# Patient Record
Sex: FEMALE
[De-identification: names, ages, dates, MRNs, and addresses within clinical notes are randomized per-mention and may not be internally consistent; named-entity substitution may affect disease eponyms.]

---
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
## 2019-06-04 ENCOUNTER — APPOINTMENT (OUTPATIENT)
Dept: PEDIATRICS | Facility: CLINIC | Age: 19
End: 2019-06-04
Payer: COMMERCIAL

## 2019-06-04 VITALS
DIASTOLIC BLOOD PRESSURE: 60 MMHG | TEMPERATURE: 98.3 F | HEART RATE: 74 BPM | SYSTOLIC BLOOD PRESSURE: 114 MMHG | WEIGHT: 96 LBS | RESPIRATION RATE: 18 BRPM | BODY MASS INDEX: 17.66 KG/M2 | HEIGHT: 62 IN

## 2019-06-04 DIAGNOSIS — Z00.00 ENCOUNTER FOR GENERAL ADULT MEDICAL EXAMINATION W/OUT ABNORMAL FINDINGS: ICD-10-CM

## 2019-06-04 DIAGNOSIS — Z00.129 ENCOUNTER FOR ROUTINE CHILD HEALTH EXAMINATION W/OUT ABNORMAL FINDINGS: ICD-10-CM

## 2019-06-04 DIAGNOSIS — F41.9 ANXIETY DISORDER, UNSPECIFIED: ICD-10-CM

## 2019-06-04 PROCEDURE — 90471 IMMUNIZATION ADMIN: CPT

## 2019-06-04 PROCEDURE — 90621 MENB-FHBP VACC 2/3 DOSE IM: CPT

## 2019-06-04 PROCEDURE — 99395 PREV VISIT EST AGE 18-39: CPT | Mod: 25

## 2019-06-04 RX ORDER — CLONAZEPAM 1 MG/1
1 TABLET ORAL
Refills: 0 | Status: COMPLETED | COMMUNITY
End: 2019-06-04

## 2019-06-04 RX ORDER — CLONAZEPAM 1 MG/1
1 TABLET, ORALLY DISINTEGRATING ORAL
Qty: 90 | Refills: 0 | Status: COMPLETED | COMMUNITY
Start: 2017-07-15 | End: 2019-06-04

## 2019-06-04 RX ORDER — CLONAZEPAM 0.5 MG/1
0.5 TABLET, ORALLY DISINTEGRATING ORAL
Qty: 150 | Refills: 0 | Status: ACTIVE | COMMUNITY
Start: 2017-12-08

## 2019-06-04 RX ORDER — SERTRALINE HYDROCHLORIDE 20 MG/ML
20 SOLUTION ORAL
Qty: 60 | Refills: 0 | Status: COMPLETED | COMMUNITY
Start: 2017-12-08 | End: 2019-06-04

## 2019-06-04 RX ORDER — ESCITALOPRAM OXALATE 5 MG/5ML
5 SOLUTION ORAL
Qty: 600 | Refills: 0 | Status: COMPLETED | COMMUNITY
Start: 2017-11-17 | End: 2019-06-04

## 2019-06-04 NOTE — HISTORY OF PRESENT ILLNESS
[Yes] : Patient goes to dentist yearly [Mother] : mother [Up to date] : Up to date [Normal] : normal [LMP: _____] : LMP: [unfilled] [Cycle Length: _____ days] : Cycle Length: [unfilled] days [Days of Bleeding: _____] : Days of bleeding: [unfilled] [Eats meals with family] : eats meals with family [Has family members/adults to turn to for help] : has family members/adults to turn to for help [Sleep Concerns] : sleep concerns [Is permitted and is able to make independent decisions] : Is permitted and is able to make independent decisions [Grade: ____] : Grade: [unfilled] [Normal Behavior/Attention] : normal behavior/attention [Normal Performance] : normal performance [Drinks non-sweetened liquids] : drinks non-sweetened liquids  [Eats regular meals including adequate fruits and vegetables] : eats regular meals including adequate fruits and vegetables [Normal Homework] : normal homework [Has concerns about body or appearance] : does not have concerns about body or appearance [Calcium source] : calcium source [At least 1 hour of physical activity a day] : at least 1 hour of physical activity a day [Has friends] : has friends [Has interests/participates in community activities/volunteers] : does not have interests/participates in community activities/volunteers [Screen time (except homework) less than 2 hours a day] : screen time (except homework) less than 2 hours a day [Uses electronic nicotine delivery system] : does not use electronic nicotine delivery system [Exposure to electronic nicotine delivery system] : no exposure to electronic nicotine delivery system [Uses tobacco] : does not use tobacco [Uses drugs] : does not use drugs  [Exposure to tobacco] : no exposure to tobacco [Exposure to drugs] : no exposure to drugs [Exposure to alcohol] : no exposure to alcohol [Drinks alcohol] : does not drink alcohol [Uses safety belts/safety equipment] : uses safety belts/safety equipment  [Has peer relationships free of violence] : has peer relationships free of violence [Impaired/distracted driving] : no impaired/distracted driving [Displays self-confidence] : displays self-confidence [No] : Patient has not had sexual intercourse. [Has ways to cope with stress] : has ways to cope with stress [Has problems with sleep] : does not have problems with sleep [Gets depressed, anxious, or irritable/has mood swings] : does not get depressed, anxious, or irritable/has mood swings [Has thought about hurting self or considered suicide] : has not thought about hurting self or considered suicide [With Teen] : teen [With Parent/Guardian] : parent/guardian [FreeTextEntry7] : 20 yo female doing well [FreeTextEntry8] : on OCP [de-identified] : doing well but still sees GI for abdominal discomfort [de-identified] : walking [de-identified] : going into 3rd year of college in sept [de-identified] : therapist once a week, occasional anxiety helped by meds [FreeTextEntry1] : 19 yr old doing well. In college. Followed by GI and Psych.

## 2019-06-04 NOTE — PHYSICAL EXAM
[Alert] : alert [EOMI Bilateral] : EOMI bilateral [No Acute Distress] : no acute distress [Normocephalic] : normocephalic [Pink Nasal Mucosa] : pink nasal mucosa [Clear tympanic membranes with bony landmarks and light reflex present bilaterally] : clear tympanic membranes with bony landmarks and light reflex present bilaterally  [Nonerythematous Oropharynx] : nonerythematous oropharynx [Supple, full passive range of motion] : supple, full passive range of motion [Clear to Ausculatation Bilaterally] : clear to auscultation bilaterally [Regular Rate and Rhythm] : regular rate and rhythm [No Palpable Masses] : no palpable masses [No Murmurs] : no murmurs [+2 Femoral Pulses] : +2 femoral pulses [Normal S1, S2 audible] : normal S1, S2 audible [NonTender] : non tender [Soft] : soft [Normoactive Bowel Sounds] : normoactive bowel sounds [Non Distended] : non distended [No Abnormal Lymph Nodes Palpated] : no abnormal lymph nodes palpated [No Splenomegaly] : no splenomegaly [No Hepatomegaly] : no hepatomegaly [No Gait Asymmetry] : no gait asymmetry [Normal Muscle Tone] : normal muscle tone [No pain or deformities with palpation of bone, muscles, joints] : no pain or deformities with palpation of bone, muscles, joints [Straight] : straight [Cranial Nerves Grossly Intact] : cranial nerves grossly intact [+2 Patella DTR] : +2 patella DTR [No Rash or Lesions] : no rash or lesions

## 2019-06-04 NOTE — DISCUSSION/SUMMARY
[] : Counseling for  all components of the vaccines given today (see orders below) discussed with patient and patient’s parent/legal guardian. VIS statement provided as well. All questions answered. [FreeTextEntry1] : 19 yr old female doing well. FOllowed my GI and Psych. No gI dx. Has an appendiceal cyst which is being observed. ?IBS?? Followed for anxiety. On lexapro and klonapin/. Good weight gain this visit. Diet discussed. Ding well at college.  19 year female here for well visit. Normal growth and development observed. Recommend balanced diet with all food groups. Brush teeth twice daily and recommend visiting dentist twice per year for cleaning. Maintain consistent daily routines and sleep schedule. Personal hygiene, puberty, and sexual health reviewed. Risky behaviors assessed. School progress discussed. Limit screen time to less than 2 hours per day. Encourage physical activity.  Return 1 year for routine well visit.\par trumemba vaccine discussed.Given

## 2019-06-15 LAB
25(OH)D3 SERPL-MCNC: 11 NG/ML
ALBUMIN SERPL ELPH-MCNC: 4.1 G/DL
ALP BLD-CCNC: 61 U/L
ALT SERPL-CCNC: 9 U/L
ANION GAP SERPL CALC-SCNC: 11 MMOL/L
AST SERPL-CCNC: 14 U/L
BASOPHILS # BLD AUTO: 0.04 K/UL
BASOPHILS NFR BLD AUTO: 0.6 %
BILIRUB SERPL-MCNC: 0.3 MG/DL
BUN SERPL-MCNC: 7 MG/DL
CALCIUM SERPL-MCNC: 9.2 MG/DL
CHLORIDE SERPL-SCNC: 103 MMOL/L
CHOLEST SERPL-MCNC: 164 MG/DL
CHOLEST/HDLC SERPL: 3.4 RATIO
CO2 SERPL-SCNC: 24 MMOL/L
CREAT SERPL-MCNC: 0.66 MG/DL
EOSINOPHIL # BLD AUTO: 0.32 K/UL
EOSINOPHIL NFR BLD AUTO: 5.1 %
GLUCOSE SERPL-MCNC: 78 MG/DL
HCT VFR BLD CALC: 41 %
HDLC SERPL-MCNC: 49 MG/DL
HGB BLD-MCNC: 12.7 G/DL
IMM GRANULOCYTES NFR BLD AUTO: 0.3 %
LDLC SERPL CALC-MCNC: 90 MG/DL
LYMPHOCYTES # BLD AUTO: 1.93 K/UL
LYMPHOCYTES NFR BLD AUTO: 30.7 %
MAN DIFF?: NORMAL
MCHC RBC-ENTMCNC: 29.3 PG
MCHC RBC-ENTMCNC: 31 GM/DL
MCV RBC AUTO: 94.5 FL
MONOCYTES # BLD AUTO: 0.47 K/UL
MONOCYTES NFR BLD AUTO: 7.5 %
NEUTROPHILS # BLD AUTO: 3.5 K/UL
NEUTROPHILS NFR BLD AUTO: 55.8 %
PLATELET # BLD AUTO: 307 K/UL
POTASSIUM SERPL-SCNC: 4.4 MMOL/L
PROT SERPL-MCNC: 6.3 G/DL
RBC # BLD: 4.34 M/UL
RBC # FLD: 12.7 %
SODIUM SERPL-SCNC: 138 MMOL/L
TRIGL SERPL-MCNC: 126 MG/DL
WBC # FLD AUTO: 6.28 K/UL

## 2019-09-19 ENCOUNTER — OTHER (OUTPATIENT)
Age: 19
End: 2019-09-19

## 2019-11-08 ENCOUNTER — APPOINTMENT (OUTPATIENT)
Dept: PEDIATRICS | Facility: CLINIC | Age: 19
End: 2019-11-08
Payer: COMMERCIAL

## 2019-11-08 VITALS — TEMPERATURE: 98.2 F

## 2019-11-08 PROCEDURE — 90686 IIV4 VACC NO PRSV 0.5 ML IM: CPT

## 2019-11-08 PROCEDURE — 90471 IMMUNIZATION ADMIN: CPT

## 2019-11-15 ENCOUNTER — APPOINTMENT (OUTPATIENT)
Dept: PEDIATRICS | Facility: CLINIC | Age: 19
End: 2019-11-15
Payer: COMMERCIAL

## 2019-11-15 VITALS — TEMPERATURE: 98.5 F | WEIGHT: 92.2 LBS

## 2019-11-15 DIAGNOSIS — R10.813 RIGHT LOWER QUADRANT ABDOMINAL TENDERNESS: ICD-10-CM

## 2019-11-15 PROCEDURE — 99213 OFFICE O/P EST LOW 20 MIN: CPT

## 2019-11-15 RX ORDER — TRETINOIN 0.5 MG/G
0.05 CREAM TOPICAL
Qty: 45 | Refills: 0 | Status: ACTIVE | COMMUNITY
Start: 2018-12-07

## 2019-11-15 RX ORDER — NORETHINDRONE ACETATE AND ETHINYL ESTRADIOL TABLETS AND FERROUS FUMARATE TABLETS 1MG-20(24)
1-20 KIT ORAL
Qty: 84 | Refills: 0 | Status: ACTIVE | COMMUNITY
Start: 2019-08-27

## 2019-11-15 NOTE — PHYSICAL EXAM
[Soft] : soft [Non Distended] : non distended [NL] : warm [FreeTextEntry9] : diffusely tender to light palpation in all quadrants, + psoas sign

## 2019-11-15 NOTE — HISTORY OF PRESENT ILLNESS
[FreeTextEntry6] : 19 year female with 9-10/10 stomach pain. Stomach pain since last night after consuming dinner. She has chronic GI concerns including meckel's diverticulectomy at age 4, cyst adjacent to cecum present (at RLQ) and has seen specialist at [facility] to monitor it. Per mom she does not have her appendix, but her pain currently is in the spot where her appendix scar is. The pain is described as stabbing. She had a normal BM yesterday. She has not had fever. She has not been able to eat or drink anything in about 24 hours. She urinated once today.

## 2019-11-15 NOTE — DISCUSSION/SUMMARY
[FreeTextEntry1] : 19 year female with diffuse abdominal pain in RLQ with a significant PMH for meckel's diverticulitis, appendectomy, and cyst in RLQ. She has not eaten or drank water in 24 hours. + Peritoneal signs. Needs to go to ER this evening for evaluation, sonogram, and possible re-hydration. Mom and patient understand.

## 2020-01-07 ENCOUNTER — MESSAGE (OUTPATIENT)
Age: 20
End: 2020-01-07

## 2020-01-29 ENCOUNTER — APPOINTMENT (OUTPATIENT)
Dept: PEDIATRICS | Facility: CLINIC | Age: 20
End: 2020-01-29
Payer: COMMERCIAL

## 2020-01-29 VITALS — WEIGHT: 96.6 LBS

## 2020-01-29 VITALS — TEMPERATURE: 98.9 F

## 2020-01-29 DIAGNOSIS — K66.0 PERITONEAL ADHESIONS (POSTPROCEDURAL) (POSTINFECTION): ICD-10-CM

## 2020-01-29 DIAGNOSIS — T81.49XA INFECTION FOLLOWING A PROCEDURE, OTHER SURGICAL SITE, INITIAL ENCOUNTER: ICD-10-CM

## 2020-01-29 DIAGNOSIS — L08.9 LOCAL INFECTION OF THE SKIN AND SUBCUTANEOUS TISSUE, UNSPECIFIED: ICD-10-CM

## 2020-01-29 PROCEDURE — 99215 OFFICE O/P EST HI 40 MIN: CPT

## 2020-01-29 RX ORDER — ESCITALOPRAM OXALATE 5 MG/5ML
5 SOLUTION ORAL
Refills: 0 | Status: ACTIVE | COMMUNITY
Start: 2020-01-29

## 2020-01-29 RX ORDER — DICLOFENAC SODIUM 50 MG/1
50 TABLET, DELAYED RELEASE ORAL
Qty: 20 | Refills: 0 | Status: COMPLETED | COMMUNITY
Start: 2020-01-06

## 2020-01-29 RX ORDER — OXYCODONE HYDROCHLORIDE 5 MG/5ML
5 SOLUTION ORAL
Qty: 60 | Refills: 0 | Status: COMPLETED | COMMUNITY
Start: 2020-01-04

## 2020-01-29 NOTE — HISTORY OF PRESENT ILLNESS
[FreeTextEntry6] : 19 year old female had laparoscopic exploratory surgery on 1/3 and mom is worried that it might be infected.  She had a post-operative visit about 2 weeks afterwards with the surgeon. Patient also started developing an itchy rash about 3 days ago. Patient is afebrile.  She is having intense pain at the surgical site (her belly button in particular). She has one small site to the left upper abdomen, belly button site, left lower abdomen, and suprapubic area. She will be following up with a motility specialist at [facility]. She has not had fever. She has noticed a small amount of drainage from her belly button area. They used surgical glue (dermabond) on the incision sites, no sutures. She did not have any bandages covering the incision sites. Mom says the entire family gets allergy local skin reactions to various adhesives (Ottoniel is sensitive to the bandaids used after vaccines, for example). No KNOWN allergens.

## 2020-01-29 NOTE — PHYSICAL EXAM
[NL] : regular rate and rhythm, normal S1, S2 audible, no murmurs [FreeTextEntry9] : LUQ incision-- approximated, surrounding area with 1.6 cm x 0.6 cm erythema. Umbilical incision- erythema and edema to site, honey crusting, erythematous area 3.4 cm x 5 cm including umbilicus, tender to touch. LLQ incision well approximated with 3.5 cm x 2 cm erythematous surrounding skin, borders are less clear, few erythematous papules scattered surrounding. Suprapubic incision well approximated, 3.5 cm x 2 cm erythematous skin surrounding [de-identified] : raised pruritic papules to posterior thighs

## 2020-01-29 NOTE — DISCUSSION/SUMMARY
[FreeTextEntry1] : 19 year female s/p exploratory laparoscopy and adhesion lysis of the RLQ on 1/3 (from prior surgery at age 4). Her umbilical surgical site does appear infected. She has some scattered itchiness and a papular rash to her abdomen as well as the back of her thighs. She has a questionable skin sensitivity to adhesives and could be having a delayed hypersensitivity to the dermabond glue used but this is a bit of a stretch given that the surgery was over 3 weeks ago. She could be experiencing some sort of rash and pruritus secondary to her skin infection. Advised that she should take Augmentin as prescribed BID x 10 days. Apply mupirocin ointment topically to the 4 incision sites BID x 7 days. She may take benadryl as needed for itchiness. May apply hydrocortisone 1% to the back of the thighs as well. There is a possibility that she had some tape on the back of her legs during surgery that she was not aware of, but once again since it was 3 weeks ago now I do not think this is likely. She will follow up with her surgeon again sometime this week or next week. If she cannot get an appointment with her surgeon for a follow up, she will come back in to see us. Alert office immediately if there are any signs of worsening infection, fever, or severe abdominal pain. I cleansed the umbilical site with betadine and cultured the skin. I also outlined the erythema of the umbilical surgical site for mom to observe for worsening redness.

## 2020-02-10 LAB — BACTERIA WND CULT: ABNORMAL

## 2020-05-08 ENCOUNTER — APPOINTMENT (OUTPATIENT)
Dept: PEDIATRICS | Facility: CLINIC | Age: 20
End: 2020-05-08
Payer: COMMERCIAL

## 2020-05-08 VITALS — TEMPERATURE: 98.3 F | WEIGHT: 96.6 LBS

## 2020-05-08 DIAGNOSIS — L30.9 DERMATITIS, UNSPECIFIED: ICD-10-CM

## 2020-05-08 PROCEDURE — 99213 OFFICE O/P EST LOW 20 MIN: CPT

## 2020-05-08 NOTE — HISTORY OF PRESENT ILLNESS
[FreeTextEntry6] : 19 y/o presents with a rash on both sides of mouth x 1 1/2 wks. and rash on chest between both breasts x 1 wk. Afebrile she also co of rash on arm , rash is dry and flaky.

## 2020-05-08 NOTE — PHYSICAL EXAM
[NL] : normotonic [de-identified] : no rash visible on sides of mouth at this time, may be slightly dry but no swelling or erythema. ,dry patch rash on body consistent with eczema

## 2020-08-27 ENCOUNTER — APPOINTMENT (OUTPATIENT)
Dept: PEDIATRICS | Facility: CLINIC | Age: 20
End: 2020-08-27
Payer: COMMERCIAL

## 2020-08-27 VITALS — TEMPERATURE: 99.1 F
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
## 2020-08-27 PROCEDURE — 99213 OFFICE O/P EST LOW 20 MIN: CPT

## 2020-08-27 RX ORDER — AMOXICILLIN AND CLAVULANATE POTASSIUM 400; 57 MG/5ML; MG/5ML
400-57 POWDER, FOR SUSPENSION ORAL
Qty: 150 | Refills: 0 | Status: DISCONTINUED | COMMUNITY
Start: 2020-01-29 | End: 2020-08-27

## 2020-08-27 NOTE — HISTORY OF PRESENT ILLNESS
[FreeTextEntry6] : 20 year old presents with being bit on her right hand middle finger and thumb by a hamster about 2 weeks ago.  She works at [employer]. Blood was drawn. She visited [facility] when this occurred, and they prescribed Augmentin x 10 days.  About 7 days after the hamster bite, she developed bumps on her middle finger (the same finger the bite occurred). The bumps are tender especially when she tries to bend her finger or make a fist.

## 2020-08-27 NOTE — DISCUSSION/SUMMARY
[FreeTextEntry1] : 20 year female s/p hamster bite to the thumb and middle fingers of her right hand 2 weeks ago. She took 10 days of Augmentin. About 7 days after the bite she started to develop what look like warts on her middle finger, which is the finger of the bite. They are tender. I am unsure as to which pathogens to have on my radar for a hamster bite so I am referring her to infectious disease. Apply mupirocin BID to lesions until ID evaluates her. It could be just coincidental that she has developed warts on that finger but will have it evaluated to be safe.

## 2020-08-27 NOTE — PHYSICAL EXAM
[NL] : no abnormal lymph nodes palpated [de-identified] : right hand inside with several small umbilicated skin colored papules, some peeling of the skin near joint, no erythema or swelling

## 2020-08-27 NOTE — REVIEW OF SYSTEMS
[Fever] : no fever [Negative] : Heme/Lymph [FreeTextEntry1] : hamster bite, bumps to right hand middle finger

## 2020-09-04 ENCOUNTER — TRANSCRIPTION ENCOUNTER (OUTPATIENT)
Age: 20
End: 2020-09-04

## 2020-09-04 ENCOUNTER — APPOINTMENT (OUTPATIENT)
Dept: INFECTIOUS DISEASE | Facility: CLINIC | Age: 20
End: 2020-09-04
Payer: COMMERCIAL

## 2020-09-04 VITALS
TEMPERATURE: 98.6 F | WEIGHT: 100 LBS | DIASTOLIC BLOOD PRESSURE: 61 MMHG | HEART RATE: 78 BPM | SYSTOLIC BLOOD PRESSURE: 104 MMHG | OXYGEN SATURATION: 96 %

## 2020-09-04 DIAGNOSIS — S61.259A OPEN BITE OF UNSPECIFIED FINGER W/OUT DAMAGE TO NAIL, INITIAL ENCOUNTER: ICD-10-CM

## 2020-09-04 DIAGNOSIS — L98.9 DISORDER OF THE SKIN AND SUBCUTANEOUS TISSUE, UNSPECIFIED: ICD-10-CM

## 2020-09-04 LAB
BASOPHILS # BLD AUTO: 0.05 K/UL
BASOPHILS NFR BLD AUTO: 0.7 %
EOSINOPHIL # BLD AUTO: 0.57 K/UL
EOSINOPHIL NFR BLD AUTO: 8.5 %
HCT VFR BLD CALC: 36.4 %
HGB BLD-MCNC: 11.6 G/DL
IMM GRANULOCYTES NFR BLD AUTO: 0.3 %
LYMPHOCYTES # BLD AUTO: 2.81 K/UL
LYMPHOCYTES NFR BLD AUTO: 42.1 %
MAN DIFF?: NORMAL
MCHC RBC-ENTMCNC: 30.1 PG
MCHC RBC-ENTMCNC: 31.9 GM/DL
MCV RBC AUTO: 94.5 FL
MONOCYTES # BLD AUTO: 0.86 K/UL
MONOCYTES NFR BLD AUTO: 12.9 %
NEUTROPHILS # BLD AUTO: 2.37 K/UL
NEUTROPHILS NFR BLD AUTO: 35.5 %
PLATELET # BLD AUTO: 216 K/UL
PROCALCITONIN SERPL-MCNC: <0.02 NG/ML
RBC # BLD: 3.85 M/UL
RBC # FLD: 12.9 %
WBC # FLD AUTO: 6.68 K/UL

## 2020-09-04 PROCEDURE — 99205 OFFICE O/P NEW HI 60 MIN: CPT

## 2020-09-04 RX ORDER — CLINDAMYCIN PHOSPHATE 10 MG/ML
1 LOTION TOPICAL
Qty: 60 | Refills: 0 | Status: DISCONTINUED | COMMUNITY
Start: 2019-01-03 | End: 2020-09-04

## 2020-09-04 RX ORDER — TRETINOIN 0.25 MG/G
0.03 CREAM TOPICAL
Qty: 45 | Refills: 0 | Status: DISCONTINUED | COMMUNITY
Start: 2019-01-03 | End: 2020-09-04

## 2020-09-04 NOTE — ASSESSMENT
[FreeTextEntry1] : hamster bite on 8/3/20 with persistent local discoloration, swelling and considerable pain.\par she is non toxic, without lymphangitis or regional lymphadenopathy.\par She had course of Augmentin\par \par She is unaware of origin of the hamster.\par \par in addition to staph and strep infections, hamster bites have been reported to transmit Fransicella tularensis, Salmonella and Lymphoctyic Chorionic Meningitis virus. There are no systemic signs - the process appears localized only.\par \par CBC, ProCalcitonin level and Richardson tularensis antibodies will be drawn today.\par Will speak by phone next week\par Levofloxacin 500 mg /20 mL solution (she is unable to swallow pills) x 10 days - potential adverse effects reviewed\par Patient agrees to take photo of lesion every 2 days to monitor progression\par She is asked to see her Dermatologist as soon as possible to consider skin bx and culture [Rx Dose / Side Effects] : Rx dose/side effects [Treatment Education] : treatment education [Medical Care Issues] : medical care issues

## 2020-09-04 NOTE — CONSULT LETTER
[Dear  ___] : Dear  [unfilled], [Please see my note below.] : Please see my note below. [Consult Closing:] : Thank you very much for allowing me to participate in the care of this patient.  If you have any questions, please do not hesitate to contact me. [Consult Letter:] : I had the pleasure of evaluating your patient, [unfilled]. [Sincerely,] : Sincerely, [FreeTextEntry2] : Janine Mixon MD [FreeTextEntry3] : Gary Anderson MD, FACP, [facility], [facility]\par Infectious Diseases\par [facility] [DrJose Raul  ___] : Dr. DEWITT

## 2020-09-04 NOTE — PHYSICAL EXAM
[General Appearance - Alert] : alert [General Appearance - In No Acute Distress] : in no acute distress [Sclera] : the sclera and conjunctiva were normal [PERRL With Normal Accommodation] : pupils were equal in size, round, reactive to light [Extraocular Movements] : extraocular movements were intact [Outer Ear] : the ears and nose were normal in appearance [Neck Appearance] : the appearance of the neck was normal [Oropharynx] : the oropharynx was normal with no thrush [Neck Cervical Mass (___cm)] : no neck mass was observed [Thyroid Diffuse Enlargement] : the thyroid was not enlarged [Jugular Venous Distention Increased] : there was no jugular-venous distention [Auscultation Breath Sounds / Voice Sounds] : lungs were clear to auscultation bilaterally [Heart Rate And Rhythm] : heart rate was normal and rhythm regular [Murmurs] : no murmurs [Heart Sounds] : normal S1 and S2 [Heart Sounds Gallop] : no gallops [Heart Sounds Pericardial Friction Rub] : no pericardial rub [Edema] : there was no peripheral edema [Bowel Sounds] : normal bowel sounds [Abdomen Soft] : soft [Abdomen Tenderness] : non-tender [Abdomen Mass (___ Cm)] : no abdominal mass palpated [] : no hepato-splenomegaly [Costovertebral Angle Tenderness] : no CVA tenderness [No Palpable Adenopathy] : no palpable adenopathy [Musculoskeletal - Swelling] : no joint swelling [Nail Clubbing] : no clubbing  or cyanosis of the fingernails [Motor Tone] : muscle strength and tone were normal [FreeTextEntry1] : superficial discoloration resembling petechial eruption on palmar aspect of middle phalanx 3rd finger, no lymphangitis, mild swelling with pain on active flexion [No Focal Deficits] : no focal deficits [Oriented To Time, Place, And Person] : oriented to person, place, and time [Affect] : the affect was normal

## 2020-09-10 ENCOUNTER — TRANSCRIPTION ENCOUNTER (OUTPATIENT)
Age: 20
End: 2020-09-10

## 2020-09-10 LAB
F. TULARENSIS AB, IGG: NEGATIVE
F. TULARENSIS AB, IGM: NEGATIVE
F. TULARENSIS INTERPRETATION: NORMAL
SARS-COV-2 IGG SERPL IA-ACNC: <0.1 INDEX
SARS-COV-2 IGG SERPL QL IA: NEGATIVE

## 2021-10-22 ENCOUNTER — APPOINTMENT (OUTPATIENT)
Dept: PEDIATRICS | Facility: CLINIC | Age: 21
End: 2021-10-22
Payer: COMMERCIAL

## 2021-10-22 VITALS
DIASTOLIC BLOOD PRESSURE: 72 MMHG | BODY MASS INDEX: 19.88 KG/M2 | HEIGHT: 62 IN | SYSTOLIC BLOOD PRESSURE: 110 MMHG | HEART RATE: 76 BPM | TEMPERATURE: 98.5 F | WEIGHT: 108 LBS | RESPIRATION RATE: 18 BRPM

## 2021-10-22 DIAGNOSIS — K02.9 DENTAL CARIES, UNSPECIFIED: ICD-10-CM

## 2021-10-22 DIAGNOSIS — Z01.818 ENCOUNTER FOR OTHER PREPROCEDURAL EXAMINATION: ICD-10-CM

## 2021-10-22 PROCEDURE — 99214 OFFICE O/P EST MOD 30 MIN: CPT

## 2021-10-22 RX ORDER — LEVOFLOXACIN 25 MG/ML
25 SOLUTION ORAL
Qty: 1 | Refills: 0 | Status: DISCONTINUED | COMMUNITY
Start: 2020-09-04 | End: 2021-10-22

## 2021-10-22 RX ORDER — CYPROHEPTADINE HYDROCHLORIDE 2 MG/5ML
2 SOLUTION ORAL
Refills: 0 | Status: ACTIVE | COMMUNITY

## 2021-10-22 RX ORDER — MUPIROCIN 20 MG/G
2 OINTMENT TOPICAL TWICE DAILY
Qty: 1 | Refills: 1 | Status: DISCONTINUED | COMMUNITY
Start: 2020-08-27 | End: 2021-10-22

## 2021-10-22 RX ORDER — MUPIROCIN 20 MG/G
2 OINTMENT TOPICAL TWICE DAILY
Qty: 1 | Refills: 1 | Status: DISCONTINUED | COMMUNITY
Start: 2020-01-29 | End: 2021-10-22

## 2022-01-07 ENCOUNTER — TRANSCRIPTION ENCOUNTER (OUTPATIENT)
Age: 22
End: 2022-01-07

## 2022-01-22 ENCOUNTER — TRANSCRIPTION ENCOUNTER (OUTPATIENT)
Age: 22
End: 2022-01-22

## 2022-04-21 ENCOUNTER — APPOINTMENT (OUTPATIENT)
Dept: PEDIATRICS | Facility: CLINIC | Age: 22
End: 2022-04-21
Payer: COMMERCIAL

## 2022-04-21 VITALS — TEMPERATURE: 99.5 F | OXYGEN SATURATION: 99 %

## 2022-04-21 VITALS — TEMPERATURE: 97.9 F

## 2022-04-21 DIAGNOSIS — Z23 ENCOUNTER FOR IMMUNIZATION: ICD-10-CM

## 2022-04-21 PROCEDURE — 90621 MENB-FHBP VACC 2/3 DOSE IM: CPT

## 2022-04-21 PROCEDURE — 90471 IMMUNIZATION ADMIN: CPT

## 2022-04-21 NOTE — DISCUSSION/SUMMARY
[FreeTextEntry1] : Men B #2 given in right arm. Some lightheadedness afterwards, laying down on table in exam room for 5 minutes and reports feeling better. Defers water or snacks, ate this AM before visit.\par \par Mom has questions about her allergies, no OTC medications working-- refer to allergist.\par Patient is 22 and needs to follow up with adult GP following this immunization update. [] : The components of the vaccine(s) to be administered today are listed in the plan of care. The disease(s) for which the vaccine(s) are intended to prevent and the risks have been discussed with the caretaker.  The risks are also included in the appropriate vaccination information statements which have been provided to the patient's caregiver.  The caregiver has given consent to vaccinate.

## 2023-05-05 ENCOUNTER — NON-APPOINTMENT (OUTPATIENT)
Age: 23
End: 2023-05-05

## 2023-05-12 ENCOUNTER — APPOINTMENT (OUTPATIENT)
Dept: ORTHOPEDIC SURGERY | Facility: CLINIC | Age: 23
End: 2023-05-12
Payer: COMMERCIAL

## 2023-05-12 DIAGNOSIS — S63.616D: ICD-10-CM

## 2023-05-12 PROCEDURE — 73130 X-RAY EXAM OF HAND: CPT | Mod: RT

## 2023-05-12 PROCEDURE — 99203 OFFICE O/P NEW LOW 30 MIN: CPT

## 2023-05-14 PROBLEM — S63.616D: Status: ACTIVE | Noted: 2023-05-14

## 2023-12-26 ENCOUNTER — NON-APPOINTMENT (OUTPATIENT)
Age: 23
End: 2023-12-26

## 2025-01-30 ENCOUNTER — APPOINTMENT (OUTPATIENT)
Dept: UROGYNECOLOGY | Facility: CLINIC | Age: 25
End: 2025-01-30